# Patient Record
Sex: FEMALE | Race: WHITE
[De-identification: names, ages, dates, MRNs, and addresses within clinical notes are randomized per-mention and may not be internally consistent; named-entity substitution may affect disease eponyms.]

---

## 2020-12-14 ENCOUNTER — HOSPITAL ENCOUNTER (EMERGENCY)
Dept: HOSPITAL 63 - ER | Age: 25
Discharge: HOME | End: 2020-12-14
Payer: OTHER GOVERNMENT

## 2020-12-14 VITALS — WEIGHT: 158.73 LBS | BODY MASS INDEX: 28.12 KG/M2 | HEIGHT: 63 IN

## 2020-12-14 VITALS — SYSTOLIC BLOOD PRESSURE: 123 MMHG | DIASTOLIC BLOOD PRESSURE: 85 MMHG

## 2020-12-14 DIAGNOSIS — R10.9: ICD-10-CM

## 2020-12-14 DIAGNOSIS — R07.1: Primary | ICD-10-CM

## 2020-12-14 LAB
ALBUMIN SERPL-MCNC: 3.7 G/DL (ref 3.4–5)
ALBUMIN/GLOB SERPL: 1 {RATIO} (ref 1–1.7)
ALP SERPL-CCNC: 76 U/L (ref 46–116)
ALT SERPL-CCNC: 19 U/L (ref 14–59)
ANION GAP SERPL CALC-SCNC: 14 MMOL/L (ref 6–14)
APTT PPP: YELLOW S
AST SERPL-CCNC: 14 U/L (ref 15–37)
BACTERIA #/AREA URNS HPF: 0 /HPF
BASOPHILS # BLD AUTO: 0 X10^3/UL (ref 0–0.2)
BASOPHILS NFR BLD: 0 % (ref 0–3)
BILIRUB SERPL-MCNC: 0.3 MG/DL (ref 0.2–1)
BILIRUB UR QL STRIP: (no result)
BUN/CREAT SERPL: 12 (ref 6–20)
CA-I SERPL ISE-MCNC: 11 MG/DL (ref 7–20)
CALCIUM SERPL-MCNC: 9.1 MG/DL (ref 8.5–10.1)
CHLORIDE SERPL-SCNC: 103 MMOL/L (ref 98–107)
CO2 SERPL-SCNC: 21 MMOL/L (ref 21–32)
CREAT SERPL-MCNC: 0.9 MG/DL (ref 0.6–1)
EOSINOPHIL NFR BLD: 0.2 X10^3/UL (ref 0–0.7)
EOSINOPHIL NFR BLD: 2 % (ref 0–3)
ERYTHROCYTE [DISTWIDTH] IN BLOOD BY AUTOMATED COUNT: 12.6 % (ref 11.5–14.5)
FIBRINOGEN PPP-MCNC: CLEAR MG/DL
GFR SERPLBLD BASED ON 1.73 SQ M-ARVRAT: 75.7 ML/MIN
GLOBULIN SER-MCNC: 3.8 G/DL (ref 2.2–3.8)
GLUCOSE SERPL-MCNC: 107 MG/DL (ref 70–99)
GLUCOSE UR STRIP-MCNC: (no result) MG/DL
HCT VFR BLD CALC: 43.8 % (ref 36–47)
HGB BLD-MCNC: 14.8 G/DL (ref 12–15.5)
LYMPHOCYTES # BLD: 1.9 X10^3/UL (ref 1–4.8)
LYMPHOCYTES NFR BLD AUTO: 15 % (ref 24–48)
MCH RBC QN AUTO: 31 PG (ref 25–35)
MCHC RBC AUTO-ENTMCNC: 34 G/DL (ref 31–37)
MCV RBC AUTO: 92 FL (ref 79–100)
MONO #: 0.6 X10^3/UL (ref 0–1.1)
MONOCYTES NFR BLD: 5 % (ref 0–9)
NEUT #: 9.8 X10^3UL (ref 1.8–7.7)
NEUTROPHILS NFR BLD AUTO: 78 % (ref 31–73)
NITRITE UR QL STRIP: (no result)
PLATELET # BLD AUTO: 229 X10^3/UL (ref 140–400)
POTASSIUM SERPL-SCNC: 4 MMOL/L (ref 3.5–5.1)
PROT SERPL-MCNC: 7.5 G/DL (ref 6.4–8.2)
RBC # BLD AUTO: 4.75 X10^6/UL (ref 3.5–5.4)
RBC #/AREA URNS HPF: 0 /HPF (ref 0–2)
SODIUM SERPL-SCNC: 138 MMOL/L (ref 136–145)
SP GR UR STRIP: >=1.03
SQUAMOUS #/AREA URNS LPF: (no result) /LPF
UROBILINOGEN UR-MCNC: 0.2 MG/DL
WBC # BLD AUTO: 12.6 X10^3/UL (ref 4–11)
WBC #/AREA URNS HPF: (no result) /HPF (ref 0–4)

## 2020-12-14 PROCEDURE — 80053 COMPREHEN METABOLIC PANEL: CPT

## 2020-12-14 PROCEDURE — 84484 ASSAY OF TROPONIN QUANT: CPT

## 2020-12-14 PROCEDURE — 81001 URINALYSIS AUTO W/SCOPE: CPT

## 2020-12-14 PROCEDURE — 85379 FIBRIN DEGRADATION QUANT: CPT

## 2020-12-14 PROCEDURE — 85025 COMPLETE CBC W/AUTO DIFF WBC: CPT

## 2020-12-14 PROCEDURE — 71046 X-RAY EXAM CHEST 2 VIEWS: CPT

## 2020-12-14 PROCEDURE — 36415 COLL VENOUS BLD VENIPUNCTURE: CPT

## 2020-12-14 PROCEDURE — 93005 ELECTROCARDIOGRAM TRACING: CPT

## 2020-12-14 PROCEDURE — 81025 URINE PREGNANCY TEST: CPT

## 2020-12-14 PROCEDURE — 71275 CT ANGIOGRAPHY CHEST: CPT

## 2020-12-14 PROCEDURE — 99285 EMERGENCY DEPT VISIT HI MDM: CPT

## 2020-12-14 NOTE — RAD
INDICATION: Reason: Left, upper lobe pain   Omni 350 100cc / Spl. Instructions:  / History: 



COMPARISON: Chest x-ray from same day



TECHNIQUE: 



Axial CT images obtained through the chest. Intravenous contrast utilized.  Angiogram 3D images proce
ssed per protocol.



One or more of the following individualized dose reduction techniques were utilized for this examinat
ion:  1. Automated exposure control;  2. Adjustment of the mA and/or kV according to patient size;  3
. Use of iterative reconstruction technique.



FINDINGS:



 No evidence of pneumothorax.

No focal airspace consolidation.

Motion obscures a portion of the ascending thoracic aorta but no aneurysm or dissection flap in the v
isualized portions. No central pulmonary embolus with peripheral limitation secondary to breathing mo
tion.





IMPRESSION:



No central pulmonary embolus.



No evidence of pneumothorax or focal airspace consolidation.



Electronically signed by: Zane Celaya MD (12/14/2020 4:32 AM) [id number]

## 2020-12-14 NOTE — PHYS DOC
General Adult


HPI:


HPI:





Patient is a 45-year-old female coming in for about 3 days of left flank pain 

that since last evening has moved up to her left anterior shoulder.  Patient 

states pain is worse with taking a deep breath, not changed by movement or 

palpation.  Patient denies any injury.  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  

Denies any fevers, cough, nausea, vomiting, diarrhea.  Patient denies any 

personal medical history.  Has a family history of blood clots and kidney 

stones.  Patient states she does not drink much water and her urine is sometimes

darker but denies any personal history of kidney stone or blood.  Denies any 

dysuria or vaginal discharge.  Patient is actively trying to prepare for IVF 

next month, is taking a oral contraceptive currently that she is about to stop 

and has been getting various injections.





Review of Systems:


Review of Systems:


Constitutional:  Denies fever or chills 


Eyes:  Denies change in visual acuity 


HENT:  Denies nasal congestion or sore throat 


Respiratory:  Denies cough or shortness of breath, chest pain worse with 

breathing


Cardiovascular:  Denies chest pain or edema 


GI:  Denies abdominal pain, nausea, vomiting, bloody stools or diarrhea 


: Denies dysuria, but has had darker urine


Musculoskeletal:  Denies back pain or joint pain 


Integument:  Denies rash 


Neurologic:  Denies headache, focal weakness or sensory changes 


Endocrine:  Denies polyuria or polydipsia 


Lymphatic:  Denies swollen glands 


Psychiatric:  Denies depression or anxiety





Physical Exam:


PE:





Constitutional: Well developed, well nourished, no acute distress, non-toxic 

appearance. []


HENT: Normocephalic, atraumatic, bilateral external ears normal, oropharynx 

moist, no oral exudates, nose normal. []


Eyes: PERRLA, EOMI, conjunctiva normal, no discharge. [] 


Neck: Normal range of motion, no tenderness, supple, no stridor. [] 


Cardiovascular:Heart rate regular rhythm, no murmur []


Lungs & Thorax:  Bilateral breath sounds clear to auscultation []


Abdomen: Bowel sounds normal, soft, no tenderness, no masses, no pulsatile emily

s. [] 


Skin: Warm, dry, no erythema, no rash. [] 


Back: No tenderness, no CVA tenderness. [] 


Extremities: No tenderness, no cyanosis, no clubbing, ROM intact, no edema. [] 


Neurologic: Alert and oriented X 3, normal motor function, normal sensory 

function, no focal deficits noted. []


Psychologic: Affect normal, judgement normal, mood normal. []





EKG:


EKG:


Normal sinus rhythm, normal axis, no ectopy, no ST elevation or depression, 

heart rate 98 bpm.





Radiology/Procedures:


Radiology/Procedures:





INDICATION: Reason: left chest pain / Spl. Instructions:  / History: 





COMPARISON: None.





FINDINGS:





2 view of chest obtained.


Mild scoliotic curvature the spine. Cardiac silhouette is unremarkable. No 

definite focal airspace consolidation. Mild degenerative changes of spine.








IMPRESSION:





*  No definite focal airspace consolidation or pulmonary edema.


[]


INDICATION: Reason: Left, upper lobe pain   Omni 350 100cc / Spl. Instructions: 

 / History: 





COMPARISON: Chest x-ray from same day





TECHNIQUE: 





Axial CT images obtained through the chest. Intravenous contrast utilized.  

Angiogram 3D images processed per protocol.





One or more of the following individualized dose reduction techniques were 

utilized for this examination:  1. Automated exposure control;  2. Adjustment of

 the mA and/or kV according to patient size;  3. Use of iterative reconstruction

 technique.





FINDINGS:





 No evidence of pneumothorax.


No focal airspace consolidation.


Motion obscures a portion of the ascending thoracic aorta but no aneurysm or 

dissection flap in the visualized portions. No central pulmonary embolus with 

peripheral limitation secondary to breathing motion.








IMPRESSION:





No central pulmonary embolus.





No evidence of pneumothorax or focal airspace consolidation.


[]





Heart Score:


HEART Score for Chest Pain:  








HEART Score for Chest Pain Response (Comments) Value


 


History Slighlty/Non-Suspicious 0


 


ECG Normal 0


 


Age < 45 0


 


Risk Factors No Risk Factors 0


 


Troponin < Normal Limit 0


 


Total  0








Risk Factors:


Risk Factors:  DM, Current or recent (<one month) smoker, HTN, HLP, family 

history of CAD, obesity.


Risk Scores:


Score 0 - 3:  2.5% MACE over next 6 weeks - Discharge Home


Score 4 - 6:  20.3% MACE over next 6 weeks - Admit for Clinical Observation


Score 7 - 10:  72.7% MACE over next 6 weeks - Early Invasive Strategies





Course & Med Decision Making:


Course & Med Decision Making


Pertinent Labs and Imaging studies reviewed. (See chart for details)





[]





Dragon Disclaimer:


Dragon Disclaimer:


This electronic medical record was generated, in whole or in part, using a voice

 recognition dictation system.





Departure


Departure:


Impression:  


   Primary Impression:  


   Chest pain


Disposition:  01 DC HOME SELF CARE/HOMELESS


Condition:  STABLE


Referrals:  


PCP,UNKNOWN (PCP)


Patient Instructions:  Pleurisy





Additional Instructions:  


Increase fluid intake, may take ibuprofen or Tylenol for pain











ELI LIZ MD              Dec 14, 2020 02:42

## 2020-12-14 NOTE — EKG
[facility] [address], [place] [address]

Test Date:    2020               Test Time:    02:52:21

Pat Name:     XAVIER JOSHI            Department:   

Patient ID:   [id number]           Room:          

Gender:       F                        Technician:   

:          [id number]               Requested By: ELI LIZ

Order Number: [id number]            Reading MD:     

                                 Measurements

Intervals                              Axis          

Rate:         98                       P:            107

MI:           138                      QRS:          52

QRSD:         80                       T:            44

QT:           346                                    

QTc:          444                                    

                           Interpretive Statements

SINUS RHYTHM

NORMAL ECG

RI6.02

No previous ECG available for comparison